# Patient Record
Sex: FEMALE | Race: WHITE | NOT HISPANIC OR LATINO | Employment: FULL TIME | URBAN - METROPOLITAN AREA
[De-identification: names, ages, dates, MRNs, and addresses within clinical notes are randomized per-mention and may not be internally consistent; named-entity substitution may affect disease eponyms.]

---

## 2023-04-04 ENCOUNTER — TELEPHONE (OUTPATIENT)
Dept: FAMILY MEDICINE | Facility: CLINIC | Age: 33
End: 2023-04-04

## 2023-04-04 NOTE — TELEPHONE ENCOUNTER
Patient moved to [place] and would like to establish care with a provider that can manage her health problems.    Per patient, she lived in [facility] for a year and prior to that was living in [place].  She can obtain her medical records from [place].  She could obtain her records from [facility] but stated that she was only seeing a doctor there for the purpose of refilling her medication.    Patient reports that she was diagnosed with Hashimoto's disease 5 years ago.   Currently on Levothyroxine and has been stable on this.  Has seasonal allergies.     Advised that she can see any of our primary care providers for these issues.  For more complicated cases, provider may refer her to endocrinology but this would still need to start with a visit with a primary care provider  Patient verbalized understanding.  Appointment scheduled with Melania Chen at our [place] clinic on 5/9/2023      Marissa Mancia RN  [facility]

## 2023-05-21 ENCOUNTER — HEALTH MAINTENANCE LETTER (OUTPATIENT)
Age: 33
End: 2023-05-21

## 2024-07-28 ENCOUNTER — HEALTH MAINTENANCE LETTER (OUTPATIENT)
Age: 34
End: 2024-07-28

## 2024-12-07 ENCOUNTER — OFFICE VISIT (OUTPATIENT)
Dept: URGENT CARE | Facility: URGENT CARE | Age: 34
End: 2024-12-07
Payer: COMMERCIAL

## 2024-12-07 ENCOUNTER — ANCILLARY PROCEDURE (OUTPATIENT)
Dept: GENERAL RADIOLOGY | Facility: CLINIC | Age: 34
End: 2024-12-07
Attending: PHYSICIAN ASSISTANT
Payer: COMMERCIAL

## 2024-12-07 VITALS
TEMPERATURE: 99 F | DIASTOLIC BLOOD PRESSURE: 70 MMHG | OXYGEN SATURATION: 98 % | SYSTOLIC BLOOD PRESSURE: 101 MMHG | WEIGHT: 188 LBS | HEART RATE: 63 BPM

## 2024-12-07 DIAGNOSIS — S89.92XA KNEE INJURY, LEFT, INITIAL ENCOUNTER: Primary | ICD-10-CM

## 2024-12-07 DIAGNOSIS — S89.92XA KNEE INJURY, LEFT, INITIAL ENCOUNTER: ICD-10-CM

## 2024-12-07 PROCEDURE — 73562 X-RAY EXAM OF KNEE 3: CPT | Mod: TC | Performed by: RADIOLOGY

## 2024-12-07 PROCEDURE — 99204 OFFICE O/P NEW MOD 45 MIN: CPT | Performed by: PHYSICIAN ASSISTANT

## 2024-12-07 RX ORDER — SERTRALINE HYDROCHLORIDE 25 MG/1
100 TABLET, FILM COATED ORAL DAILY
COMMUNITY

## 2024-12-07 RX ORDER — LEVOTHYROXINE SODIUM 25 UG/1
25 TABLET ORAL DAILY
COMMUNITY

## 2024-12-07 NOTE — PROGRESS NOTES
SUBJECTIVE:  Chief Complaint   Patient presents with    Urgent Care     Slipped on ice yesterday and landed on left knee and left arm/shoulder, possible right side.      Alyssa M Saint is a 34 year old female presents with a chief complaint of left knee pain, swelling, tenderness, decreased range of motion, and bruise.  The injury occurred 1 day(s) ago. The injury happened while while walking. How: fall , twistedimmediate pain.  The patient complained of moderate pain  and has had decreased ROM.  Pain exacerbated by weight-bearing and movement.  Relieved by rest.  She treated it initially with ice. This is the first time this type of injury has occurred to this patient.     No past medical history on file.  Current Outpatient Medications   Medication Sig Dispense Refill    levothyroxine (SYNTHROID/LEVOTHROID) 25 MCG tablet Take 25 mcg by mouth daily.      sertraline (ZOLOFT) 25 MG tablet Take 100 mg by mouth daily.       Social History     Tobacco Use    Smoking status: Not on file    Smokeless tobacco: Not on file   Substance Use Topics    Alcohol use: Not on file       ROS:  Review of systems negative except as stated above.    EXAM:   /70   Pulse 63   Temp 99  F (37.2  C) (Tympanic)   Wt 85.3 kg (188 lb)   SpO2 98%   Gen: healthy,alert,no distress  Extremity: knee has swelling, point tenderness, decreased ROM, and bruising.   GENERAL APPEARANCE: healthy, alert and no distress  CHEST: clear to auscultation  CV: regular rate and rhythm  EXTREMITIES: peripheral pulses normal  SKIN: no suspicious lesions or rashes  NEURO: Normal strength and tone, sensory exam grossly normal, mentation intact and speech normal    X-ray image initially interpreted in clinic by me in order to rule out fracture/dislocation.  No evidence appreciated.        ASSESSMENT:   (S89.92XA) Knee injury, left, initial encounter  (primary encounter diagnosis)  Comment: No evidence of fracture or dislocation   Plan: XR Knee Left 3 Views,  Orthopedic          Referral  RICE  Continue knee sleeve       There are no Patient Instructions on file for this visit.

## 2025-08-10 ENCOUNTER — HEALTH MAINTENANCE LETTER (OUTPATIENT)
Age: 35
End: 2025-08-10

## 2025-08-27 ENCOUNTER — OFFICE VISIT (OUTPATIENT)
Dept: URGENT CARE | Facility: URGENT CARE | Age: 35
End: 2025-08-27
Payer: COMMERCIAL

## 2025-08-27 ENCOUNTER — ANCILLARY PROCEDURE (OUTPATIENT)
Dept: GENERAL RADIOLOGY | Facility: CLINIC | Age: 35
End: 2025-08-27
Attending: INTERNAL MEDICINE
Payer: COMMERCIAL

## 2025-08-27 VITALS
DIASTOLIC BLOOD PRESSURE: 70 MMHG | OXYGEN SATURATION: 99 % | HEIGHT: 64 IN | HEART RATE: 60 BPM | BODY MASS INDEX: 32.27 KG/M2 | TEMPERATURE: 97.5 F | RESPIRATION RATE: 18 BRPM | SYSTOLIC BLOOD PRESSURE: 110 MMHG

## 2025-08-27 DIAGNOSIS — S93.601A SPRAIN OF RIGHT FOOT, INITIAL ENCOUNTER: ICD-10-CM
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
## 2025-08-27 DIAGNOSIS — R07.81 RIB PAIN ON RIGHT SIDE: ICD-10-CM

## 2025-08-27 DIAGNOSIS — S20.211A CHEST WALL CONTUSION, RIGHT, INITIAL ENCOUNTER: ICD-10-CM

## 2025-08-27 DIAGNOSIS — S99.911A ANKLE INJURY, RIGHT, INITIAL ENCOUNTER: ICD-10-CM

## 2025-08-27 DIAGNOSIS — S99.921A FOOT INJURY, RIGHT, INITIAL ENCOUNTER: ICD-10-CM

## 2025-08-27 PROCEDURE — 99213 OFFICE O/P EST LOW 20 MIN: CPT | Performed by: INTERNAL MEDICINE

## 2025-08-27 PROCEDURE — 71101 X-RAY EXAM UNILAT RIBS/CHEST: CPT | Mod: TC | Performed by: RADIOLOGY

## 2025-08-27 PROCEDURE — 3078F DIAST BP <80 MM HG: CPT | Performed by: INTERNAL MEDICINE

## 2025-08-27 PROCEDURE — 73630 X-RAY EXAM OF FOOT: CPT | Mod: TC | Performed by: RADIOLOGY

## 2025-08-27 PROCEDURE — 3074F SYST BP LT 130 MM HG: CPT | Performed by: INTERNAL MEDICINE

## 2025-08-27 ASSESSMENT — ENCOUNTER SYMPTOMS
COUGH: 0
SHORTNESS OF BREATH: 0
SLEEP DISTURBANCE: 1